# Patient Record
Sex: MALE | Race: BLACK OR AFRICAN AMERICAN | NOT HISPANIC OR LATINO | Employment: FULL TIME | URBAN - METROPOLITAN AREA
[De-identification: names, ages, dates, MRNs, and addresses within clinical notes are randomized per-mention and may not be internally consistent; named-entity substitution may affect disease eponyms.]

---

## 2017-06-03 ENCOUNTER — PATIENT MESSAGE (OUTPATIENT)
Dept: INTERNAL MEDICINE | Facility: CLINIC | Age: 32
End: 2017-06-03

## 2017-06-05 NOTE — TELEPHONE ENCOUNTER
Spoke with patient advised to come in for an appt patient already has an appt scheduled for this Friday 6-9-17 with Dr. Faith to discuss test results give at his job.

## 2017-06-09 ENCOUNTER — LAB VISIT (OUTPATIENT)
Dept: LAB | Facility: HOSPITAL | Age: 32
End: 2017-06-09
Attending: FAMILY MEDICINE
Payer: COMMERCIAL

## 2017-06-09 ENCOUNTER — OFFICE VISIT (OUTPATIENT)
Dept: INTERNAL MEDICINE | Facility: CLINIC | Age: 32
End: 2017-06-09
Attending: FAMILY MEDICINE
Payer: COMMERCIAL

## 2017-06-09 VITALS
WEIGHT: 150 LBS | BODY MASS INDEX: 21.47 KG/M2 | HEIGHT: 70 IN | OXYGEN SATURATION: 96 % | HEART RATE: 81 BPM | DIASTOLIC BLOOD PRESSURE: 70 MMHG | SYSTOLIC BLOOD PRESSURE: 128 MMHG

## 2017-06-09 DIAGNOSIS — R94.31 ABNORMAL EKG: ICD-10-CM

## 2017-06-09 DIAGNOSIS — D72.819 LEUKOPENIA, UNSPECIFIED TYPE: ICD-10-CM

## 2017-06-09 DIAGNOSIS — D72.819 LEUKOPENIA, UNSPECIFIED TYPE: Primary | ICD-10-CM

## 2017-06-09 LAB
ERYTHROCYTE [DISTWIDTH] IN BLOOD BY AUTOMATED COUNT: 12.8 %
HCT VFR BLD AUTO: 42.5 %
HGB BLD-MCNC: 14.3 G/DL
MCH RBC QN AUTO: 29.1 PG
MCHC RBC AUTO-ENTMCNC: 33.6 %
MCV RBC AUTO: 86 FL
NEUTROPHILS # BLD AUTO: 2.1 K/UL
PLATELET # BLD AUTO: 243 K/UL
PMV BLD AUTO: 10.4 FL
RBC # BLD AUTO: 4.92 M/UL
WBC # BLD AUTO: 4.02 K/UL

## 2017-06-09 PROCEDURE — 99999 PR PBB SHADOW E&M-EST. PATIENT-LVL III: CPT | Mod: PBBFAC,,, | Performed by: FAMILY MEDICINE

## 2017-06-09 PROCEDURE — 99214 OFFICE O/P EST MOD 30 MIN: CPT | Mod: S$GLB,,, | Performed by: FAMILY MEDICINE

## 2017-06-09 PROCEDURE — 36415 COLL VENOUS BLD VENIPUNCTURE: CPT

## 2017-06-09 PROCEDURE — 85027 COMPLETE CBC AUTOMATED: CPT

## 2017-06-09 NOTE — PROGRESS NOTES
Subjective:       Patient ID: Talia Nicole is a 31 y.o. male.    Chief Complaint: Follow-up    HPI  Review of Systems   Constitutional: Negative for activity change, chills, fatigue, fever and unexpected weight change.   HENT: Negative for congestion, hearing loss, rhinorrhea and trouble swallowing.    Eyes: Negative for discharge, redness and visual disturbance.   Respiratory: Negative for cough, chest tightness, shortness of breath and wheezing.    Cardiovascular: Negative for chest pain, palpitations and leg swelling.   Gastrointestinal: Negative for abdominal pain, blood in stool, constipation, diarrhea and vomiting.   Endocrine: Negative for polydipsia and polyuria.   Genitourinary: Negative for difficulty urinating, hematuria and urgency.   Musculoskeletal: Positive for neck pain. Negative for arthralgias, back pain, gait problem, joint swelling and myalgias.   Skin: Negative for color change and rash.   Neurological: Negative for tremors, speech difficulty, weakness, numbness and headaches.   Hematological: Negative for adenopathy. Does not bruise/bleed easily.   Psychiatric/Behavioral: Negative for behavioral problems, confusion, dysphoric mood and sleep disturbance. The patient is not nervous/anxious.        Objective:      Physical Exam   Constitutional: He is oriented to person, place, and time. He appears well-developed and well-nourished.   Eyes: No scleral icterus.   Neck: Normal range of motion. Neck supple. No JVD present. Carotid bruit is not present. No tracheal deviation present. No thyromegaly present.   Cardiovascular: Normal rate, regular rhythm, normal heart sounds and intact distal pulses.  Exam reveals no gallop and no friction rub.    No murmur heard.  Pulmonary/Chest: Effort normal and breath sounds normal. No respiratory distress. He has no wheezes. He has no rales.   Abdominal: Soft. Bowel sounds are normal. He exhibits no distension and no mass. There is no tenderness. There is no  rebound and no guarding.   Musculoskeletal: He exhibits no edema.   Lymphadenopathy:     He has cervical adenopathy.   Neurological: He is alert and oriented to person, place, and time. He displays no tremor. No cranial nerve deficit. Coordination and gait normal.   Skin: Skin is warm and dry. No rash noted. He is not diaphoretic. No erythema.   Psychiatric: He has a normal mood and affect. His behavior is normal. Judgment and thought content normal.   Nursing note and vitals reviewed.      Assessment:       1. Leukopenia, unspecified type    2. Abnormal EKG        Plan:   Talia was seen today for follow-up.    Diagnoses and all orders for this visit:    Leukopenia, unspecified type  -     CBC Oncology; Future  -     Ambulatory referral to Hematology / Oncology    Abnormal EKG      See meds, orders, follow up, routing and instructions sections of encounter.  A 31-year-old established male patient.  He had abnormal WBC count on April 2017   occupational health screen for the [employer].  We have addressed this in the past,   was approximately 3 and I thought to be benign leukopenia of  [place].    His current level was 2.4, slightly lower, he has no symptoms and no history of   any hematologic disorders.    They also flagged an abnormal EKG, which was noted in the past.  His current EKG   is similar to that that we performed in November 2014 alongside an   echocardiogram that was within normal limits.  The patient exercises regularly,   has no symptoms.    My interpretation of the above laboratory abnormality is that they   characteristic and not abnormal, we will repeat a CBC Oncology and since we have   not had him see a hematologist in the past, run it by them for any additional   ideas; however, I do think his current condition is likely benign leukopenia.      JERONIMO/HN  dd: 06/09/2017 10:56:19 (CDT)  td: 06/10/2017 01:34:20 (CDT)  Doc ID   #[id number]  Job ID #[id number]    CC:

## 2017-06-12 ENCOUNTER — TELEPHONE (OUTPATIENT)
Dept: HEMATOLOGY/ONCOLOGY | Facility: CLINIC | Age: 32
End: 2017-06-12

## 2017-06-21 ENCOUNTER — PATIENT MESSAGE (OUTPATIENT)
Dept: INTERNAL MEDICINE | Facility: CLINIC | Age: 32
End: 2017-06-21

## 2017-12-12 ENCOUNTER — OFFICE VISIT (OUTPATIENT)
Dept: URGENT CARE | Facility: CLINIC | Age: 32
End: 2017-12-12
Payer: COMMERCIAL

## 2017-12-12 VITALS
HEIGHT: 71 IN | BODY MASS INDEX: 22.4 KG/M2 | DIASTOLIC BLOOD PRESSURE: 86 MMHG | TEMPERATURE: 97 F | SYSTOLIC BLOOD PRESSURE: 152 MMHG | OXYGEN SATURATION: 100 % | HEART RATE: 99 BPM | WEIGHT: 160 LBS | RESPIRATION RATE: 16 BRPM

## 2017-12-12 DIAGNOSIS — B96.89 ACUTE BACTERIAL SINUSITIS: Primary | ICD-10-CM

## 2017-12-12 DIAGNOSIS — J01.90 ACUTE BACTERIAL SINUSITIS: Primary | ICD-10-CM

## 2017-12-12 PROCEDURE — 99213 OFFICE O/P EST LOW 20 MIN: CPT | Mod: 25,S$GLB,, | Performed by: INTERNAL MEDICINE

## 2017-12-12 PROCEDURE — 96372 THER/PROPH/DIAG INJ SC/IM: CPT | Mod: S$GLB,,, | Performed by: INTERNAL MEDICINE

## 2017-12-12 RX ORDER — AMOXICILLIN AND CLAVULANATE POTASSIUM 875; 125 MG/1; MG/1
1 TABLET, FILM COATED ORAL EVERY 12 HOURS
Qty: 20 TABLET | Refills: 0 | Status: SHIPPED | OUTPATIENT
Start: 2017-12-12 | End: 2017-12-22

## 2017-12-12 RX ORDER — BETAMETHASONE SODIUM PHOSPHATE AND BETAMETHASONE ACETATE 3; 3 MG/ML; MG/ML
9 INJECTION, SUSPENSION INTRA-ARTICULAR; INTRALESIONAL; INTRAMUSCULAR; SOFT TISSUE ONCE
Status: COMPLETED | OUTPATIENT
Start: 2017-12-12 | End: 2017-12-12

## 2017-12-12 RX ADMIN — BETAMETHASONE SODIUM PHOSPHATE AND BETAMETHASONE ACETATE 9 MG: 3; 3 INJECTION, SUSPENSION INTRA-ARTICULAR; INTRALESIONAL; INTRAMUSCULAR; SOFT TISSUE at 12:12

## 2017-12-12 NOTE — PROGRESS NOTES
"Subjective:       Patient ID: Talia Nicole is a 32 y.o. male.    Vitals:  height is 5' 11" (1.803 m) and weight is 72.6 kg (160 lb). His temperature is 97.1 °F (36.2 °C). His blood pressure is 152/86 (abnormal) and his pulse is 99. His respiration is 16 and oxygen saturation is 100%.     Chief Complaint: URI (pt said he has been sick for 3 days)    URI    This is a new problem. The current episode started in the past 7 days. The problem has been unchanged. There has been no fever. Associated symptoms include congestion and headaches. Pertinent negatives include no abdominal pain, chest pain, coughing, ear pain, nausea, sore throat or wheezing. He has tried decongestant for the symptoms. The treatment provided mild relief.     Review of Systems   Constitution: Negative for chills, fever and malaise/fatigue.   HENT: Positive for congestion. Negative for ear pain, hoarse voice and sore throat.    Eyes: Negative for discharge and redness.   Cardiovascular: Negative for chest pain, dyspnea on exertion and leg swelling.   Respiratory: Negative for cough, shortness of breath, sputum production and wheezing.    Musculoskeletal: Negative for myalgias.   Gastrointestinal: Negative for abdominal pain and nausea.   Neurological: Positive for headaches.       Objective:      Physical Exam   Constitutional: He appears well-developed and well-nourished.   HENT:   Head: Normocephalic and atraumatic.   Nose: Mucosal edema (purulent mucous in nasal passage) present.   Eyes: Conjunctivae and EOM are normal. Pupils are equal, round, and reactive to light.   Neck: Normal range of motion. Neck supple.   Cardiovascular: Normal rate and regular rhythm.    Pulmonary/Chest: Effort normal and breath sounds normal.   Vitals reviewed.      Assessment:       1. Acute bacterial sinusitis        Plan:         Acute bacterial sinusitis  -     betamethasone acetate-betamethasone sodium phosphate injection 9 mg; Inject 1.5 mLs (9 mg total) into the " muscle once.  -     amoxicillin-clavulanate 875-125mg (AUGMENTIN) 875-125 mg per tablet; Take 1 tablet by mouth every 12 (twelve) hours.  Dispense: 20 tablet; Refill: 0

## 2019-07-26 ENCOUNTER — OFFICE VISIT (OUTPATIENT)
Dept: INTERNAL MEDICINE | Facility: CLINIC | Age: 34
End: 2019-07-26
Attending: FAMILY MEDICINE
Payer: COMMERCIAL

## 2019-07-26 ENCOUNTER — LAB VISIT (OUTPATIENT)
Dept: LAB | Facility: HOSPITAL | Age: 34
End: 2019-07-26
Attending: FAMILY MEDICINE
Payer: COMMERCIAL

## 2019-07-26 VITALS
TEMPERATURE: 99 F | HEIGHT: 70 IN | SYSTOLIC BLOOD PRESSURE: 126 MMHG | BODY MASS INDEX: 20.83 KG/M2 | DIASTOLIC BLOOD PRESSURE: 80 MMHG | WEIGHT: 145.5 LBS | HEART RATE: 73 BPM | OXYGEN SATURATION: 98 %

## 2019-07-26 DIAGNOSIS — Z00.00 ANNUAL PHYSICAL EXAM: Primary | ICD-10-CM

## 2019-07-26 DIAGNOSIS — Z00.00 ANNUAL PHYSICAL EXAM: ICD-10-CM

## 2019-07-26 DIAGNOSIS — D72.819 LEUKOPENIA, UNSPECIFIED TYPE: ICD-10-CM

## 2019-07-26 LAB
ANION GAP SERPL CALC-SCNC: 9 MMOL/L (ref 8–16)
BUN SERPL-MCNC: 12 MG/DL (ref 6–20)
CALCIUM SERPL-MCNC: 9.6 MG/DL (ref 8.7–10.5)
CHLORIDE SERPL-SCNC: 107 MMOL/L (ref 95–110)
CHOLEST SERPL-MCNC: 181 MG/DL (ref 120–199)
CHOLEST/HDLC SERPL: 2.1 {RATIO} (ref 2–5)
CO2 SERPL-SCNC: 29 MMOL/L (ref 23–29)
CREAT SERPL-MCNC: 1.2 MG/DL (ref 0.5–1.4)
ERYTHROCYTE [DISTWIDTH] IN BLOOD BY AUTOMATED COUNT: 13.2 % (ref 11.5–14.5)
EST. GFR  (AFRICAN AMERICAN): >60 ML/MIN/1.73 M^2
EST. GFR  (NON AFRICAN AMERICAN): >60 ML/MIN/1.73 M^2
GLUCOSE SERPL-MCNC: 84 MG/DL (ref 70–110)
HCT VFR BLD AUTO: 45 % (ref 40–54)
HDLC SERPL-MCNC: 86 MG/DL (ref 40–75)
HDLC SERPL: 47.5 % (ref 20–50)
HGB BLD-MCNC: 14.9 G/DL (ref 14–18)
LDLC SERPL CALC-MCNC: 84.2 MG/DL (ref 63–159)
MCH RBC QN AUTO: 29.4 PG (ref 27–31)
MCHC RBC AUTO-ENTMCNC: 33.1 G/DL (ref 32–36)
MCV RBC AUTO: 89 FL (ref 82–98)
NONHDLC SERPL-MCNC: 95 MG/DL
PLATELET # BLD AUTO: 175 K/UL (ref 150–350)
PMV BLD AUTO: 10.3 FL (ref 9.2–12.9)
POTASSIUM SERPL-SCNC: 4 MMOL/L (ref 3.5–5.1)
RBC # BLD AUTO: 5.07 M/UL (ref 4.6–6.2)
SODIUM SERPL-SCNC: 145 MMOL/L (ref 136–145)
TRIGL SERPL-MCNC: 54 MG/DL (ref 30–150)
WBC # BLD AUTO: 3.77 K/UL (ref 3.9–12.7)

## 2019-07-26 PROCEDURE — 99395 PR PREVENTIVE VISIT,EST,18-39: ICD-10-PCS | Mod: S$GLB,,, | Performed by: FAMILY MEDICINE

## 2019-07-26 PROCEDURE — 99395 PREV VISIT EST AGE 18-39: CPT | Mod: S$GLB,,, | Performed by: FAMILY MEDICINE

## 2019-07-26 PROCEDURE — 36415 COLL VENOUS BLD VENIPUNCTURE: CPT

## 2019-07-26 PROCEDURE — 80061 LIPID PANEL: CPT

## 2019-07-26 PROCEDURE — 85027 COMPLETE CBC AUTOMATED: CPT

## 2019-07-26 PROCEDURE — 99999 PR PBB SHADOW E&M-EST. PATIENT-LVL III: ICD-10-PCS | Mod: PBBFAC,,, | Performed by: FAMILY MEDICINE

## 2019-07-26 PROCEDURE — 99999 PR PBB SHADOW E&M-EST. PATIENT-LVL III: CPT | Mod: PBBFAC,,, | Performed by: FAMILY MEDICINE

## 2019-07-26 PROCEDURE — 80048 BASIC METABOLIC PNL TOTAL CA: CPT

## 2019-07-26 RX ORDER — NYSTATIN AND TRIAMCINOLONE ACETONIDE 100000; 1 [USP'U]/G; MG/G
CREAM TOPICAL 2 TIMES DAILY
Qty: 30 G | Refills: 0 | Status: SHIPPED | OUTPATIENT
Start: 2019-07-26

## 2019-07-26 NOTE — PATIENT INSTRUCTIONS
Schedule lab orders for today.       Information about cholesterol, high blood pressure and healthy diet and activity recommendations can be found at the following links on the Internet:    http://www.nhlbi.nih.gov/health/health-topics/topics/hbc  http://www.nhlbi.nih.gov/health/educational/lose_wt/index.htm  Http://www.nhlbi.nih.gov/files/docs/public/heart/hbp_low.pdf  http://www.heart.org/HEARTORG/  http://diabetes.org/  https://www.cdc.gov/  Https://healthfinder.gov/  https://health.gov/dietaryguidelines/2015/guidelines/  https://health.gov/paguidelines/second-edition/pdf/Physical_Activity_Guidelines_2nd_edition.pdf

## 2019-07-26 NOTE — PROGRESS NOTES
Subjective:       Patient ID: Talia Nicole is a 33 y.o. male.    Chief Complaint: Annual Exam    Established patient for an annual wellness check/physical exam and also chronic disease management. Specific complaints - see dictation and please see ROS.  P, S, Fm, Soc Hx's; Meds, allergies reviewed and reconciled.  Health maintenance file reviewed and addressed items due.    Review of Systems   Constitutional: Negative for chills, fatigue, fever and unexpected weight change.   HENT: Negative for congestion and trouble swallowing.    Eyes: Negative for redness and visual disturbance.   Respiratory: Negative for cough, chest tightness and shortness of breath.    Cardiovascular: Negative for chest pain, palpitations and leg swelling.   Gastrointestinal: Negative for abdominal pain and blood in stool.   Genitourinary: Negative for difficulty urinating and hematuria.   Musculoskeletal: Negative for arthralgias, back pain, gait problem, joint swelling, myalgias and neck pain.   Skin: Positive for rash. Negative for color change.   Neurological: Negative for tremors, speech difficulty, weakness, numbness and headaches.   Hematological: Negative for adenopathy. Does not bruise/bleed easily.   Psychiatric/Behavioral: Negative for behavioral problems, confusion and sleep disturbance. The patient is not nervous/anxious.        Objective:      Physical Exam   Constitutional: He is oriented to person, place, and time. He appears well-developed and well-nourished. No distress.   HENT:   Head: Normocephalic.   Right Ear: Tympanic membrane, external ear and ear canal normal.   Left Ear: Tympanic membrane, external ear and ear canal normal.   Nose: Nose normal.   Mouth/Throat: Uvula is midline, oropharynx is clear and moist and mucous membranes are normal. No oropharyngeal exudate.   Eyes: Pupils are equal, round, and reactive to light. Conjunctivae are normal. Right eye exhibits no discharge. Left eye exhibits no discharge. No  scleral icterus.   Neck: Normal range of motion. Neck supple. Carotid bruit is not present. No thyromegaly present.   Cardiovascular: Normal rate, regular rhythm, normal heart sounds and intact distal pulses. Exam reveals no gallop and no friction rub.   No murmur heard.  Pulmonary/Chest: Effort normal and breath sounds normal. No respiratory distress. He has no wheezes. He has no rales. He exhibits no tenderness.   Abdominal: Soft. He exhibits no distension and no mass. There is no tenderness. There is no rebound and no guarding.   Musculoskeletal: Normal range of motion. He exhibits no edema or tenderness.   Lymphadenopathy:     He has no cervical adenopathy.   Neurological: He is alert and oriented to person, place, and time. He has normal strength. He displays normal reflexes. No cranial nerve deficit or sensory deficit. Coordination and gait normal.   Skin: Skin is warm and dry. No rash noted. He is not diaphoretic.   Psychiatric: He has a normal mood and affect. His behavior is normal. Judgment and thought content normal.   Nursing note and vitals reviewed.      Assessment:       1. Annual physical exam    2. Leukopenia, unspecified type        Plan:     Talia was seen today for annual exam.    Diagnoses and all orders for this visit:    Annual physical exam  -     CBC Without Differential; Future  -     Basic metabolic panel; Future  -     Lipid panel; Future    Leukopenia, unspecified type  Comments:  benign and stable in the past  Orders:  -     CBC Without Differential; Future    Other orders  -     nystatin-triamcinolone (MYCOLOG II) cream; Apply topically 2 (two) times daily.      See meds, orders, follow up, routing and instructions sections of encounter.  A 33-year-old established male patient for annual physical examination, does   report intermittent groin rash.  It is located at the base of the scrotum.  He   describes that it is an irritation.  In general, it is some minor  issue.    RECOMMENDATIONS:  Trial of Mycolog.  Notify me for significant worsening or   persistence.      JERONIMO/HN  dd: 07/26/2019 15:44:16 (CDT)  td: 07/27/2019 11:45:20 (CDT)  Doc ID   #[id number]  Job ID #[id number]    CC: